# Patient Record
Sex: FEMALE
[De-identification: names, ages, dates, MRNs, and addresses within clinical notes are randomized per-mention and may not be internally consistent; named-entity substitution may affect disease eponyms.]

---

## 2024-06-10 ENCOUNTER — DASHBOARD ENCOUNTERS (OUTPATIENT)
Age: 74
End: 2024-06-10

## 2024-06-10 ENCOUNTER — OFFICE VISIT (OUTPATIENT)
Dept: URBAN - METROPOLITAN AREA CLINIC 72 | Facility: CLINIC | Age: 74
End: 2024-06-10
Payer: MEDICARE

## 2024-06-10 VITALS
TEMPERATURE: 97.5 F | SYSTOLIC BLOOD PRESSURE: 153 MMHG | BODY MASS INDEX: 29.49 KG/M2 | WEIGHT: 150.2 LBS | DIASTOLIC BLOOD PRESSURE: 96 MMHG | HEART RATE: 91 BPM | HEIGHT: 60 IN

## 2024-06-10 DIAGNOSIS — K57.92 ACUTE DIVERTICULITIS: ICD-10-CM

## 2024-06-10 DIAGNOSIS — D50.9 IRON DEFICIENCY ANEMIA, UNSPECIFIED IRON DEFICIENCY ANEMIA TYPE: ICD-10-CM

## 2024-06-10 PROBLEM — 735593008: Status: ACTIVE | Noted: 2024-06-10

## 2024-06-10 PROBLEM — 87522002: Status: ACTIVE | Noted: 2024-06-10

## 2024-06-10 PROCEDURE — 99204 OFFICE O/P NEW MOD 45 MIN: CPT

## 2024-06-10 RX ORDER — MELOXICAM 15 MG/1
TAKE 1 TABLET BY MOUTH EVERY DAY WITH FOOD TABLET ORAL
Qty: 30 EACH | Refills: 1 | Status: ON HOLD | COMMUNITY

## 2024-06-10 RX ORDER — ZOLPIDEM TARTRATE 5 MG/1
TAKE 1 TABLET BY MOUTH AT BEDTIME *PLAN LIMITATIONS* TABLET ORAL
Qty: 30 EACH | Refills: 0 | Status: ON HOLD | COMMUNITY

## 2024-06-10 RX ORDER — ZOLPIDEM TARTRATE 5 MG/1
TABLET ORAL
Qty: 30 TABLET | Status: ON HOLD | COMMUNITY

## 2024-06-10 RX ORDER — NYSTATIN 100000 [USP'U]/G
1 APPLICATION POWDER TOPICAL TWICE A DAY
Status: ACTIVE | COMMUNITY

## 2024-06-10 RX ORDER — CICLOPIROX 80 MG/ML
APPLY TO AFFECTED NAILS EVERY DAY SOLUTION TOPICAL
Qty: 6.6 MILLILITER | Refills: 2 | Status: ON HOLD | COMMUNITY

## 2024-06-10 RX ORDER — OMEPRAZOLE 20 MG/1
CAPSULE, DELAYED RELEASE ORAL
Qty: 90 CAPSULE | Status: ACTIVE | COMMUNITY

## 2024-06-10 RX ORDER — IPRATROPIUM BROMIDE AND ALBUTEROL 20; 100 UG/1; UG/1
SPRAY, METERED RESPIRATORY (INHALATION)
Qty: 4 GRAM | Status: ACTIVE | COMMUNITY

## 2024-06-10 RX ORDER — TRAMADOL HYDROCHLORIDE 50 MG/1
TABLET, COATED ORAL
Qty: 30 TABLET | Status: ACTIVE | COMMUNITY

## 2024-06-10 RX ORDER — CHLORHEXIDINE GLUCONATE 1.2 MG/ML
SWISH AND SPIT BY MOUTH TWICE A DAY AS DIRCETED RINSE ORAL
Qty: 473 MILLILITER | Refills: 3 | Status: ON HOLD | COMMUNITY

## 2024-06-10 RX ORDER — CHLORHEXIDINE GLUCONATE 1.2 MG/ML
RINSE ORAL
Qty: 473 MILLILITER | Status: ON HOLD | COMMUNITY

## 2024-06-10 RX ORDER — MOMETASONE FUROATE AND FORMOTEROL FUMARATE DIHYDRATE 200; 5 UG/1; UG/1
AEROSOL RESPIRATORY (INHALATION)
Qty: 39 GRAM | Status: ACTIVE | COMMUNITY

## 2024-06-10 RX ORDER — HYDROCODONE BITARTRATE AND ACETAMINOPHEN 5; 325 MG/1; MG/1
TABLET ORAL
Qty: 6 EACH | Refills: 0 | Status: ON HOLD | COMMUNITY

## 2024-06-10 RX ORDER — MONTELUKAST SODIUM 10 MG/1
TABLET ORAL
Qty: 90 TABLET | Status: ACTIVE | COMMUNITY

## 2024-06-10 RX ORDER — AMOXICILLIN AND CLAVULANATE POTASSIUM 875; 125 MG/1; MG/1
TABLET, FILM COATED ORAL
Qty: 10 EACH | Refills: 0 | Status: ON HOLD | COMMUNITY

## 2024-06-10 NOTE — HPI-TODAY'S VISIT:
Patient is a 73 yo female here for Fe deficiency anemia. She is not having any dizziness, SOB, melena, or rectal bleeding.   She does think she had diveritculitis last week. She states she had a fever and was having lower abdominal pain. She did not have any abx but said that the pain has since resolved. She has been hospitalized for this in the past in [place].   Patient states she feels good day.   Patient is on  20 mg omeprazole daily for GERD. Her sx are controlled.   Last colon was 4 years ago with Dr Pinzon; ce recall 5 years for a histroy of numerous polyps. Last EGD was in [place] over 20 years ago for dysphagia.   Labs: 5/31/24 - WBC 3.28, RBC 4.7, HgB 10.3, Hct 36.2, MCV 76.9, Eos 14.0, Ferritin 8.1, B-12 380

## 2024-07-10 ENCOUNTER — LAB OUTSIDE AN ENCOUNTER (OUTPATIENT)
Dept: URBAN - METROPOLITAN AREA CLINIC 72 | Facility: CLINIC | Age: 74
End: 2024-07-10

## 2024-07-11 LAB
HEMATOCRIT: 36.5
HEMOGLOBIN: 11.4
MCH: 24.7
MCHC: 31.2
MCV: 79.2
MPV: 11.3
PLATELET COUNT: 184
RDW: 21.8
RED BLOOD CELL COUNT: 4.61
WHITE BLOOD CELL COUNT: 4.8

## 2024-07-15 ENCOUNTER — OFFICE VISIT (OUTPATIENT)
Dept: URBAN - METROPOLITAN AREA CLINIC 72 | Facility: CLINIC | Age: 74
End: 2024-07-15
Payer: MEDICARE

## 2024-07-15 VITALS
DIASTOLIC BLOOD PRESSURE: 76 MMHG | SYSTOLIC BLOOD PRESSURE: 152 MMHG | WEIGHT: 153 LBS | TEMPERATURE: 97.5 F | HEIGHT: 60 IN | HEART RATE: 71 BPM | BODY MASS INDEX: 30.04 KG/M2

## 2024-07-15 DIAGNOSIS — K57.92 ACUTE DIVERTICULITIS: ICD-10-CM

## 2024-07-15 DIAGNOSIS — D50.8 OTHER IRON DEFICIENCY ANEMIA: ICD-10-CM

## 2024-07-15 PROCEDURE — 99213 OFFICE O/P EST LOW 20 MIN: CPT

## 2024-07-15 RX ORDER — ZOLPIDEM TARTRATE 5 MG/1
TABLET ORAL
Qty: 30 TABLET | Status: ON HOLD | COMMUNITY

## 2024-07-15 RX ORDER — AMOXICILLIN AND CLAVULANATE POTASSIUM 875; 125 MG/1; MG/1
TABLET, FILM COATED ORAL
Qty: 10 EACH | Refills: 0 | Status: ON HOLD | COMMUNITY

## 2024-07-15 RX ORDER — TRAMADOL HYDROCHLORIDE 50 MG/1
TABLET, COATED ORAL
Qty: 30 TABLET | Status: ACTIVE | COMMUNITY

## 2024-07-15 RX ORDER — CHLORHEXIDINE GLUCONATE 1.2 MG/ML
SWISH AND SPIT BY MOUTH TWICE A DAY AS DIRCETED RINSE ORAL
Qty: 473 MILLILITER | Refills: 3 | Status: ON HOLD | COMMUNITY

## 2024-07-15 RX ORDER — CICLOPIROX 80 MG/ML
APPLY TO AFFECTED NAILS EVERY DAY SOLUTION TOPICAL
Qty: 6.6 MILLILITER | Refills: 2 | Status: ON HOLD | COMMUNITY

## 2024-07-15 RX ORDER — MELOXICAM 15 MG/1
TAKE 1 TABLET BY MOUTH EVERY DAY WITH FOOD TABLET ORAL
Qty: 30 EACH | Refills: 1 | Status: ON HOLD | COMMUNITY

## 2024-07-15 RX ORDER — MOMETASONE FUROATE AND FORMOTEROL FUMARATE DIHYDRATE 200; 5 UG/1; UG/1
AEROSOL RESPIRATORY (INHALATION)
Qty: 39 GRAM | Status: ACTIVE | COMMUNITY

## 2024-07-15 RX ORDER — OMEPRAZOLE 20 MG/1
CAPSULE, DELAYED RELEASE ORAL
Qty: 90 CAPSULE | Status: ACTIVE | COMMUNITY

## 2024-07-15 RX ORDER — ZOLPIDEM TARTRATE 5 MG/1
TAKE 1 TABLET BY MOUTH AT BEDTIME *PLAN LIMITATIONS* TABLET ORAL
Qty: 30 EACH | Refills: 0 | Status: ON HOLD | COMMUNITY

## 2024-07-15 RX ORDER — IPRATROPIUM BROMIDE AND ALBUTEROL 20; 100 UG/1; UG/1
SPRAY, METERED RESPIRATORY (INHALATION)
Qty: 4 GRAM | Status: ACTIVE | COMMUNITY

## 2024-07-15 RX ORDER — NYSTATIN 100000 [USP'U]/G
1 APPLICATION POWDER TOPICAL TWICE A DAY
Status: ACTIVE | COMMUNITY

## 2024-07-15 RX ORDER — CHLORHEXIDINE GLUCONATE 1.2 MG/ML
RINSE ORAL
Qty: 473 MILLILITER | Status: ON HOLD | COMMUNITY

## 2024-07-15 RX ORDER — MONTELUKAST SODIUM 10 MG/1
TABLET ORAL
Qty: 90 TABLET | Status: ACTIVE | COMMUNITY

## 2024-07-15 RX ORDER — HYDROCODONE BITARTRATE AND ACETAMINOPHEN 5; 325 MG/1; MG/1
TABLET ORAL
Qty: 6 EACH | Refills: 0 | Status: ON HOLD | COMMUNITY

## 2024-07-15 NOTE — HPI-TODAY'S VISIT:
Patient is a 73 yo female here for Fe deficiency anemia. She is not having any dizziness, SOB, melena, or rectal bleeding. She was last seen on 6/10/24 and a CBC was ordered at that time to reassess her blood counts as she did not want to have an anemia workup unless absolutely necessary. Labs from 7/11/24 show improvement. Patient is taking an Fe supplement and with the numbers improving, I think we can safely maintain and re-check in 4 weeks. Patient denies dizziness, SOB, melena, and fatigue.    Patient is on  20 mg omeprazole daily for GERD. Her sx are controlled.   Last colon was 4 years ago with Dr Pinzon; ce recall 5 years for a histroy of numerous polyps. Last EGD was in [place] over 20 years ago for dysphagia.   Labs: 5/31/24 - WBC 3.28, RBC 4.7, HgB 10.3, Hct 36.2, MCV 76.9, Eos 14.0, Ferritin 8.1, B-12 380   7/11/24 - WBC 4.8, RBC 4.61, HgB 11.4, Hct 36.5, MCV 79.2, MCH 24.7, MCHC 31.2, RDW 21.8, Plt 184.